# Patient Record
(demographics unavailable — no encounter records)

---

## 2025-04-29 NOTE — REVIEW OF SYSTEMS
[Diarrhea] : diarrhea [Fever] : no fever [Chills] : no chills [Vision Problems] : no vision problems [Hearing Loss] : no hearing loss [Sore Throat] : no sore throat [Chest Pain] : no chest pain [Palpitations] : no palpitations [Shortness Of Breath] : no shortness of breath [Cough] : no cough [Abdominal Pain] : no abdominal pain [Nausea] : no nausea [Constipation] : no constipation [Vomiting] : no vomiting [Dysuria] : no dysuria [Frequency] : no frequency [Headache] : no headache [Dizziness] : no dizziness [Suicidal] : not suicidal [Anxiety] : no anxiety [Depression] : no depression [FreeTextEntry8] : amenorrhea for the past 4 months.

## 2025-04-29 NOTE — HISTORY OF PRESENT ILLNESS
[FreeTextEntry8] : 27 yo F with pmh of Depression and anxiety presents to the clinic to establish care. She has been feeling depressed for 10 years. She was followed by psych in the past, Dr. Dami Schwarz, but stopped going because she no longer takes the pts insurance. She requests a referral to psych.  She stopped taking her bupripion 300mg, aripiprozole 15mg Adderall 15 mg QD in November.   She also c/o amenorrhea. States that she has not had a period in 4 months. This is about the time she stopped seeing her psychiatrist and stopped her medications. She was seen by gyn in the past and was told that she is normal. She states that her LMP 11/2024. She states that prior to November her periods would occur every 5-7 weeks. She denies palpitations, hot/cold intolerance, constipation. hair loss, hirsutism, acne, weight changes, constipation.    PMH: Depression, Anxiety  PSH: None  Allx: NKDA Meds: Vitamin D Vaccine: up to date  FamHx: OCD,  SocHx: Quit using marijuana 2-3 weeks ago.  Denies tobacco, alcohol.  Screening:  - LMP: 11/28/24-12/1/24 - Last pap smear: never  In the past year has anyone hurt your physically, mentally or emotionally? No Last Annual exam: 10/2024

## 2025-04-29 NOTE — HISTORY OF PRESENT ILLNESS
[FreeTextEntry1] : Client has experienced symptoms of depression, anxiety, and ADD since middle school. She reports experiencing fluctuations in both sleep and appetite, fatigue, difficulty concentrating, and social withdrawal. She scored 14 on LINDA-7, indicating moderate anxiety. [FreeTextEntry2] : Client reports seeing several therapists when she was in high school and college but doesn't find it helpful. She has not engaged in therapy for the past 8 years. She had been receiving psychiatric treatment in the past 10 years. Client has stopped taking psychotropic medications since Nov 2024 as her former psychiatrist stopped taking her insurance. Client has no prior hx of psychiatric hospitalizations. She reports a hx of using marijuana for emotional regulation on a daily basis but has quit about 2 weeks ago. She has no hx of substance abuse treatment. She reports experiencing constant passive suicidal ideation without past or current intent, plans, or attempts. Client denies any hx of HI or attempts of same. [FreeTextEntry3] : Client was formerly prescribed with bupropion 300mg, aripiprazole 15mg Adderall 15 mg QD but has stopped taking the medications since Nov 2024.

## 2025-04-29 NOTE — FAMILY HISTORY
[FreeTextEntry1] : Client reports that her mother might have experienced depression in her 20s but has never been diagnosed or treated. Client reports that her brother has been diagnosed with Contamination OCD since college (for 8 years) and has been receiving treatment from [facility]. Client's brother is currently working part-time at a tutoring center.

## 2025-04-29 NOTE — HEALTH RISK ASSESSMENT
[No] : No [Never (0 pts)] : Never (0 points) [Yes] : In the past 12 months have you used drugs other than those required for medical reasons? Yes [No falls in past year] : Patient reported no falls in the past year [Little interest or pleasure doing things] : 1) Little interest or pleasure doing things [Feeling down, depressed, or hopeless] : 2) Feeling down, depressed, or hopeless [3] : 2) Feeling down, depressed, or hopeless for nearly every day (3) [PHQ-2 Positive] : PHQ-2 Positive [PHQ-9 Positive] : PHQ-9 Positive [Never] : Never [Nearly Every Day (3)] : 7.) Trouble concentrating on things, such as reading a newspaper or watching television? Nearly every day [1/2 of Days or More (2)] : 8.) Moving or speaking so slowly that other people could have noticed, or the opposite, moving or speaking faster than usual? Half the days or more [Several Days (1)] : 9.) Thoughts that you would be off dead or of hurting yourself in some way? Several days [Very Difficult] : How difficult have these problems made it for you to do your work, take care of things at home, or get along with people? Very difficult [I have developed a follow-up plan documented below in the note.] : I have developed a follow-up plan documented below in the note. [de-identified] : no [de-identified] : no [de-identified] : marijuana  [de-identified] : pt is a little active  [de-identified] : pt have an okay diet  [[id number]] : 6 [[id number]] : 24

## 2025-04-29 NOTE — DISCUSSION/SUMMARY
[Low acute suicide risk] : Low acute suicide risk [FreeTextEntry1] : Client denies having any suicidal or homicidal intent, plans, or attempts.

## 2025-04-29 NOTE — RISK ASSESSMENT
[Clinical Interview] : Clinical Interview [Yes] : 1. Passive Ideation: Have you wished you were dead or wished you could go to sleep and not wake up? Yes [In last 30 days] : in the last 30 days [No] : No [Mood disorder] : mood disorder [ADHD] : ADHD [Depressed mood/Anhedonia] : depressed mood/anhedonia [Change in provider or treatment (i.e., medications, psychotherapy, milieu)] : change in provider or treatment (i.e., medications, psychotherapy, milieu) [Inadequate social supports] : inadequate social supports [Substance intoxication/withdrawal] : substance intoxication or withdrawal [Identifies reasons for living] : identifies reasons for living [Cultural, spiritual and/or moral attitudes against suicide] : cultural, spiritual and/or moral attitudes against suicide [Yes (details below)] : yes [Yes, more than 3 months ago] : yes, more than 3 months ago [None Known] : none known [Affective dysregulation] : affective dysregulation [Irritability] : irritability [Residential stability] : residential stability [Relationship stability] : relationship stability [Employment stability] : employment stability [FreeTextEntry3] : Client quit using marijuana two weeks ago. [FreeTextEntry5] : Client reports that she would never take her own life due to her Jainism beliefs and her desire to live for her mother.  [FreeTextEntry7] : Client reports occasionally getting triggered by her brother's anger problems. [FreeTextEntry9] : Client has a good relationship with her mother.

## 2025-04-29 NOTE — PSYCHOSOCIAL ASSESSMENT
[Yes, during lifetime] : Yes, during lifetime [Use within last 30 days] : use within last 30 days [_____] : Route: [unfilled] [FreeTextEntry1] : Client quit using marijuana two weeks ago. [FreeTextEntry2] : Client grew up in a Judaism family and her mother goes to Sabianism on a regular basis. Client identifies as a Judaism and prays regularly but doesn't attend Sabianism services. She reports having no contact or support from her relatives or friends, except for one friend from high school.
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
## 2025-04-29 NOTE — PHYSICAL EXAM
[No Acute Distress] : no acute distress [Well-Appearing] : well-appearing [Normal Sclera/Conjunctiva] : normal sclera/conjunctiva [Normal Outer Ear/Nose] : the outer ears and nose were normal in appearance [No Lymphadenopathy] : no lymphadenopathy [Supple] : supple [Thyroid Normal, No Nodules] : the thyroid was normal and there were no nodules present [No Respiratory Distress] : no respiratory distress  [No Accessory Muscle Use] : no accessory muscle use [Clear to Auscultation] : lungs were clear to auscultation bilaterally [Regular Rhythm] : with a regular rhythm [Normal S1, S2] : normal S1 and S2 [No Murmur] : no murmur heard [Soft] : abdomen soft [Non Tender] : non-tender [Non-distended] : non-distended [No Masses] : no abdominal mass palpated [Normal Bowel Sounds] : normal bowel sounds [Flat] : flat [Normal Rate] : a normal rate [Normal Rhythm] : a normal rhythm [Normal Tone] : normal tone [Normal Volume] : normal volume [Anxious] : not anxious [Impaired judgment] : intact judgment

## 2025-04-29 NOTE — PHYSICAL EXAM
[Cooperative] : cooperative [Depressed] : depressed [Flat] : flat [Clear] : clear [Linear/Goal Directed] : linear/goal directed [Average] : average [WNL] : within normal limits

## 2025-05-08 NOTE — PLAN
[FreeTextEntry2] : Goal: Reduce symptoms of depression and anxiety. Objective: Client will identify and challenge negative thoughts, as well as practice effective coping strategies for managing symptoms. Intervention: Clinician will assist client to practice healthy coping skills in order to increase everyday functioning and improve overall mood. Intervention: Clinician will teach client various CBT/ ACT/ DBT skills and assist in implementing these into daily life Intervention: Clinician will reinforce positive, reality based cognitive messages that enhance self-efficacy and increase adaptive action. [Acceptance and Commitment Therapy] : Acceptance and Commitment Therapy  [Cognitive and/or Behavior Therapy] : Cognitive and/or Behavior Therapy  [Dialectical Behavior Therapy] : Dialectical Behavior Therapy  [Integrative Therapy] : Integrative Therapy  [Interpersonal Therapy] : Interpersonal Therapy  [Motivational Interviewing] : Motivational Interviewing  [Psychoeducation] : Psychoeducation  [Skills training (all types)] : Skills training (all types)  [Supportive Therapy] : Supportive Therapy [Other: ____] : [unfilled] [de-identified] : This session is focused on developing therapeutic alliance and exploring client's internal parts. We identified client's self-critical part that comes with constant self-criticisms, which make client feel mentally drained.  Client named this self-critical part "the devil." We discussed using ACT and mindfulness skills to cope with these negative thoughts. Client shares that it's hard for her to focus and would at times dissociate. Provided skills training focused on a grounding technique, i.e., 18049 technique, to bring her attention back to the present moment. We recognized another part of her that encourages her to think more positively and feel better. Clinician emailed client a link to a guided meditation "Leaves on a Stream," and encouraged her to practice it before the next session.  Patient is to continue to attend psychotherapy as scheduled weekly until chief complaints are resolved and goals met.

## 2025-05-08 NOTE — RISK ASSESSMENT
[Yes, patient reports ideation or behavior] : Yes, patient reports ideation or behavior [No, patient denies ideation or behavior] : No, patient denies ideation or behavior [FreeTextEntry8] : Client expresses passive suicidal ideation but denies suicidal intent, plans, or attempts. [Low acute suicide risk] : Low acute suicide risk

## 2025-05-08 NOTE — PLAN
[FreeTextEntry2] : Goal: Reduce symptoms of depression and anxiety. Objective: Client will identify and challenge negative thoughts, as well as practice effective coping strategies for managing symptoms. Intervention: Clinician will assist client to practice healthy coping skills in order to increase everyday functioning and improve overall mood. Intervention: Clinician will teach client various CBT/ ACT/ DBT skills and assist in implementing these into daily life Intervention: Clinician will reinforce positive, reality based cognitive messages that enhance self-efficacy and increase adaptive action. [Acceptance and Commitment Therapy] : Acceptance and Commitment Therapy  [Cognitive and/or Behavior Therapy] : Cognitive and/or Behavior Therapy  [Dialectical Behavior Therapy] : Dialectical Behavior Therapy  [Integrative Therapy] : Integrative Therapy  [Interpersonal Therapy] : Interpersonal Therapy  [Motivational Interviewing] : Motivational Interviewing  [Psychoeducation] : Psychoeducation  [Skills training (all types)] : Skills training (all types)  [Supportive Therapy] : Supportive Therapy [Other: ____] : [unfilled] [de-identified] : This session is focused on developing therapeutic alliance and exploring client's internal parts. We identified client's self-critical part that comes with constant self-criticisms, which make client feel mentally drained.  Client named this self-critical part "the devil." We discussed using ACT and mindfulness skills to cope with these negative thoughts. Client shares that it's hard for her to focus and would at times dissociate. Provided skills training focused on a grounding technique, i.e., 16403 technique, to bring her attention back to the present moment. We recognized another part of her that encourages her to think more positively and feel better. Clinician emailed client a link to a guided meditation "Leaves on a Stream," and encouraged her to practice it before the next session.  Patient is to continue to attend psychotherapy as scheduled weekly until chief complaints are resolved and goals met.

## 2025-05-12 NOTE — SOCIAL HISTORY
[FreeTextEntry1] : Born and raised in [place]. Single. No children. Has a Bachelors in Psychology. Works as a   for [employer] in [place] 6 mos. Doing a fellowship on data analytics.

## 2025-05-12 NOTE — PHYSICAL EXAM
[Cooperative] : cooperative [Depressed] : depressed [Constricted] : constricted [Clear] : clear [Linear/Goal Directed] : linear/goal directed [Average] : average [WNL] : within normal limits

## 2025-05-12 NOTE — HISTORY OF PRESENT ILLNESS
[FreeTextEntry1] : 29 yo female, history of depression, anxiety referred by PCP for a psych evaluation. Chart reviewed. Pt presents with chronic depressed mood, low energy,  increased sleep, poor appetite, difficulty focusing, difficulty making decisions, social isolation. Denies SI/HI, states she does want to exist, but lives for her mother. No psychotic or manic symptoms elicited. Pt reports she was seeing a private psychiatrist in the community for 10 years but stopped in Nov 2024 due to insurance issues. She states she has been treated for depression, denies history of manic symptoms or diagnosis of Bipolar Disorder, does not meet criteria for ADHD. Reports she did not see any difference in her symptoms after she discontinued in November, had several medication trials, took meds on and off, does not think anything helped. 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    [FreeTextEntry2] : Denies psych hospitalization Denies SA Denies AH/VH Depressive symptoms began in middle school, got help at end of high school Had trials of bupropion, aripiprazole, Adderall, sertraline, lithium. fluoxetine, trazodone  Substance History alcohol - rare marijuana - "smoked daily for a long time" to self-medicate anxiety, stopped last month to live healthier

## 2025-05-12 NOTE — PLAN
[Medication education provided] : Medication education provided. [FreeTextEntry5] : No acute safety concerns. Pt presents with prominent symptoms of unipolar depression. Restart buproprion 150mg daily. Continue ind psychotherapy. Will reassess bipolarity and ADHD symptoms. Routine medical follow up care. Psych follow up next month virtual.

## 2025-05-15 NOTE — PHYSICAL EXAM
[Cooperative] : cooperative [Depressed] : depressed [Full] : full [Clear] : clear [Linear/Goal Directed] : linear/goal directed [Average] : average [WNL] : within normal limits [FreeTextEntry8] : Client cried, hyperventilated, and needed support to regulate emotions during session.

## 2025-05-15 NOTE — PLAN
[FreeTextEntry2] : Goal: Reduce symptoms of depression and anxiety. Objective: Client will identify and challenge negative thoughts, as well as practice effective coping strategies for managing symptoms. Intervention: Clinician will assist client to practice healthy coping skills in order to increase everyday functioning and improve overall mood. Intervention: Clinician will teach client various CBT/ ACT/ DBT skills and assist in implementing these into daily life Intervention: Clinician will reinforce positive, reality based cognitive messages that enhance self-efficacy and increase adaptive action. [Acceptance and Commitment Therapy] : Acceptance and Commitment Therapy  [Cognitive and/or Behavior Therapy] : Cognitive and/or Behavior Therapy  [Dialectical Behavior Therapy] : Dialectical Behavior Therapy  [Integrative Therapy] : Integrative Therapy  [Interpersonal Therapy] : Interpersonal Therapy  [Motivational Interviewing] : Motivational Interviewing  [Psychoeducation] : Psychoeducation  [Skills training (all types)] : Skills training (all types)  [Supportive Therapy] : Supportive Therapy [Other: ____] : [unfilled] [de-identified] : Client expressed feeling guilt and shame for procrastinating and being unproductive at work due to difficulty with concentration. Provided psychoeducation on symptoms of depression, including irritability. We also acknowledged the challenges she was facing at her new job. Explored and identified client's negative cognition "I have to do things right," as she holds high standards for herself. She shared that her father used to praise her even though he tends to be critical towards others. She expresses feeling irritable around her father and brother. Client cries, hyperventilates, and needs support to regulate emotions during session. Practiced using the 15502 technique and visualizing a peaceful place to regulate emotions.   Patient is to continue to attend psychotherapy as scheduled weekly until chief complaints are resolved and goals met.

## 2025-06-12 NOTE — HISTORY OF PRESENT ILLNESS
[FreeTextEntry1] : 29 yo female, history of depression, anxiety here on video for psych follow up. Reports she has been doing better, taking medication as prescribed, denies side effects. Reports stressors at home and at work on going, unable to articulate symptoms that have improved.  Reports she has a history of irregular periods, was spotting when we first met, now has her period and note improved mood. Psychoeducation re: effect of hormones on mood, wellbutrin likely not related to getting her period, advised to schedule GYN follow up. Reports she missed some psychotherapy appts but thinks it is helping and hopes to have more regular attendance.

## 2025-06-12 NOTE — PLAN
[FreeTextEntry5] : No acute safety concerns Continue Wellbutrin 150mg daily. Pt declines to titrate dose today. Continue ind psychotherapy. Routine medical follow up care. Psych follow up next month.

## 2025-06-12 NOTE — REASON FOR VISIT
[Patient preference] : as per patient preference [Other Location: e.g. Home (Enter Location, City,State)___] : The provider was located at [unfilled]. [Home] : The patient, [unfilled], was located at home, [unfilled], at the time of the visit. [Patient's space is appropriate for telehealth and maintains privacy/confidentiality.] : Patient's space is appropriate for telehealth and maintains privacy/confidentiality. [Participant(s) identity verified] : Participant(s) identity verified. [Verbal consent obtained from patient/other participant(s)] : Verbal consent for telehealth/telephonic services obtained from patient/other participant(s) [Patient] : Patient [FreeTextEntry4] : 10 [FreeTextEntry5] : 10:30 [FreeTextEntry1] : depression, anxiety

## 2025-07-02 NOTE — DISCUSSION/SUMMARY
[FreeTextEntry1] : Contacted client to follow up on their recent appointment cancellation. Client stated that she has not been able to reschedule the appointment due to scheduling conflicts. She will contact the  to schedule the next appointment when she is clear about her schedule.

## 2025-07-10 NOTE — PLAN
[FreeTextEntry2] : Goal: Reduce symptoms of depression and anxiety. Objective: Client will identify and challenge negative thoughts, as well as practice effective coping strategies for managing symptoms. Intervention: Clinician will assist client to practice healthy coping skills in order to increase everyday functioning and improve overall mood. Intervention: Clinician will teach client various CBT/ ACT/ DBT skills and assist in implementing these into daily life Intervention: Clinician will reinforce positive, reality based cognitive messages that enhance self-efficacy and increase adaptive action. [Acceptance and Commitment Therapy] : Acceptance and Commitment Therapy  [Cognitive and/or Behavior Therapy] : Cognitive and/or Behavior Therapy  [Dialectical Behavior Therapy] : Dialectical Behavior Therapy  [Integrative Therapy] : Integrative Therapy  [Interpersonal Therapy] : Interpersonal Therapy  [Motivational Interviewing] : Motivational Interviewing  [Psychoeducation] : Psychoeducation  [Skills training (all types)] : Skills training (all types)  [Supportive Therapy] : Supportive Therapy [Other: ____] : [unfilled] [de-identified] : This session focuses on exploring and building trust with client's internal parts. Client reports being unmotivated to complete tasks. She expresses an unwillingness to express or feel her emotions. She shares that a part of her feels frustrated about her lack of productivity and wants to get things done. She mentions that she wants to achieve a balance between these opposing parts - the part of her that numbs her emotions and freezes because she isn't sure what to do and the part that wants to be productive. We will continue to identify and explore client's internal parts in the upcoming sessions.  Patient is to continue to attend psychotherapy as scheduled weekly until chief complaints are resolved and goals met.